# Patient Record
Sex: FEMALE | Race: WHITE
[De-identification: names, ages, dates, MRNs, and addresses within clinical notes are randomized per-mention and may not be internally consistent; named-entity substitution may affect disease eponyms.]

---

## 2018-08-07 ENCOUNTER — HOSPITAL ENCOUNTER (EMERGENCY)
Dept: HOSPITAL 91 - FTE | Age: 28
Discharge: HOME | End: 2018-08-07
Payer: COMMERCIAL

## 2018-08-07 ENCOUNTER — HOSPITAL ENCOUNTER (EMERGENCY)
Age: 28
Discharge: HOME | End: 2018-08-07

## 2018-08-07 DIAGNOSIS — J20.9: Primary | ICD-10-CM

## 2018-08-07 PROCEDURE — 99284 EMERGENCY DEPT VISIT MOD MDM: CPT

## 2018-08-28 ENCOUNTER — HOSPITAL ENCOUNTER (EMERGENCY)
Age: 28
Discharge: HOME | End: 2018-08-28

## 2018-08-28 ENCOUNTER — HOSPITAL ENCOUNTER (EMERGENCY)
Dept: HOSPITAL 91 - FTE | Age: 28
Discharge: HOME | End: 2018-08-28
Payer: COMMERCIAL

## 2018-08-28 DIAGNOSIS — J02.9: Primary | ICD-10-CM

## 2018-08-28 PROCEDURE — 87880 STREP A ASSAY W/OPTIC: CPT

## 2018-08-28 PROCEDURE — 99283 EMERGENCY DEPT VISIT LOW MDM: CPT

## 2018-08-28 RX ADMIN — ACETAMINOPHEN 1 MG: 500 TABLET, FILM COATED ORAL at 08:09

## 2019-01-25 ENCOUNTER — HOSPITAL ENCOUNTER (EMERGENCY)
Dept: HOSPITAL 91 - FTE | Age: 29
Discharge: LEFT BEFORE DISCHARGE | End: 2019-01-25
Payer: SELF-PAY

## 2019-01-25 ENCOUNTER — HOSPITAL ENCOUNTER (EMERGENCY)
Dept: HOSPITAL 10 - FTE | Age: 29
Discharge: LEFT BEFORE DISCHARGE | End: 2019-01-25
Payer: SELF-PAY

## 2019-01-25 VITALS
HEIGHT: 64 IN | HEIGHT: 64 IN | BODY MASS INDEX: 22.09 KG/M2 | WEIGHT: 129.41 LBS | BODY MASS INDEX: 22.09 KG/M2 | WEIGHT: 129.41 LBS

## 2019-01-25 VITALS — DIASTOLIC BLOOD PRESSURE: 61 MMHG | HEART RATE: 69 BPM | SYSTOLIC BLOOD PRESSURE: 119 MMHG | RESPIRATION RATE: 20 BRPM

## 2019-01-25 DIAGNOSIS — Z53.21: Primary | ICD-10-CM

## 2019-01-26 ENCOUNTER — HOSPITAL ENCOUNTER (EMERGENCY)
Dept: HOSPITAL 91 - FTE | Age: 29
Discharge: HOME | End: 2019-01-26
Payer: COMMERCIAL

## 2019-01-26 ENCOUNTER — HOSPITAL ENCOUNTER (EMERGENCY)
Dept: HOSPITAL 10 - FTE | Age: 29
Discharge: HOME | End: 2019-01-26
Payer: COMMERCIAL

## 2019-01-26 VITALS — HEART RATE: 68 BPM | RESPIRATION RATE: 18 BRPM | DIASTOLIC BLOOD PRESSURE: 53 MMHG | SYSTOLIC BLOOD PRESSURE: 113 MMHG

## 2019-01-26 VITALS — BODY MASS INDEX: 26.53 KG/M2 | HEIGHT: 55 IN | WEIGHT: 114.64 LBS

## 2019-01-26 DIAGNOSIS — H92.02: Primary | ICD-10-CM

## 2019-01-26 PROCEDURE — 99283 EMERGENCY DEPT VISIT LOW MDM: CPT

## 2019-01-26 NOTE — ERD
ER Documentation


Chief Complaint


Chief Complaint





LEFT EAR PAIN





HPI


28-year-old female presents for left ear pain times 4 days. Patient states it's 


a buzzing sensation.  She denies any recent sickness.  She states that she has 


similar symptoms while she is on a flight.  Denies any fevers or chills.





ROS


All systems reviewed and are negative except as per history of present illness.





Medications


Home Meds


Active Scripts


Prednisone (Prednisone) 20 Mg Tab, 20 MG PO DAILY for eustacian tube dysfunction


for 3 Days, #2 TAB


   Prov:DONNA HAYWOOD          1/26/19


Loratadine* (Claritin*) 10 Mg Tablet, 10 MG PO DAILY for serous otitis for 30 


Days, TAB


   Prov:DONNA HAYWOOD          1/26/19


Acetaminophen* (Tylophen*) 500 Mg Capsule, 1 CAP PO Q6H PRN for PAIN AND OR 


ELEVATED TEMP, #30 CAP


   Prov:BEAN HENRYC         8/28/18


Ibuprofen* (Motrin*) 600 Mg Tab, 600 MG PO Q6, #30 TAB


   Prov:BEAN HENRYC         8/28/18


Fluticasone Propionate (Flonase Allergy Relief) 9.9 Ml [place].susp, 2 SPRAY NASAL


DAILY, #1 BOTTLE


   TO EACH NOSTRIL


   Prov:BEAN HENRYC         8/28/18


Cetirizine Hcl* (Zyrtec*) 10 Mg Capsule, 10 MG PO DAILY, #14 TAB.CHEW


   Prov:BEAN HENRYC         8/28/18


Cetirizine Hcl* (Zyrtec*) 10 Mg Capsule, 10 MG PO DAILY, #30 TAB.CHEW


   Prov:VICTORIA VALENCIA NP         8/7/18


Benzonatate* (Tessalon Perle*) 100 Mg Capsule, 100 MG PO Q8H PRN for COUGH, #20 


CAP


   Prov:VICTORIA VALENCIA NP         8/7/18


Azithromycin* (Zithromax*) 250 Mg Tablet, 250 MG PO .ZPACK AS DIRECTED, #6 TAB


   TAKE 500 MG (2 TABS) THE FIRST DAY THEN 250 MG (1 TAB) DAYS 2-5


   Prov:VICTORIA VALENCIA NP         8/7/18


Ibuprofen* (Motrin*) 400 Mg Tab, 400 MG PO Q8, #15 TAB


   Prov:MARIANELA DUENAS MD         6/8/18


Baclofen* (Baclofen*) 10 Mg Tablet, 10 MG PO TID for 5 Days, #15 TAB


   Prov:MARIANELA DUENAS MD         6/8/18


Lorazepam* (Ativan*) 0.5 Mg Tablet, 0.5 MG PO Q8H PRN for ANXIETY, #10 TAB


   Prov:MARIANELA DUENAS MD         6/8/18


Naproxen* (Naprosyn*) 500 Mg Tablet, 500 MG PO BID PRN for PAIN AND/OR 


INFLAMMATION, #30 TAB


   Prov:DEREK JARA PA-C         7/12/17


Cyclobenzaprine Hcl* (Cyclobenzaprine Hcl*) 10 Mg Tablet, 10 MG PO TID, #15 TAB


   Prov:DEREK JARA PA-C         7/12/17


Azithromycin* (Zithromax*) 250 Mg Tablet, 250 MG PO .ZPACK AS DIRECTED, #6 TAB


   TAKE 500 MG (2 TABS) THE FIRST DAY THEN 250 MG (1 TAB) DAYS 2-5


   Prov:JESSI CORBIN MD         4/29/17


Dextromethorphan Hb-Promethazine Hcl (Promethazine DM Syrup) 473 Ml Syrup, 5 ML 


PO Q6H PRN for COUGH, #4 OZ


   Prov:JESSI CORBIN MD         4/29/17


Ibuprofen* (Motrin*) 600 Mg Tab, 600 MG PO Q6, #15 TAB


   Prov:JESSI CORBIN MD         4/29/17


Miconazole Nitrate* (Miconazole Nitrate*) 2% - 15 Gm Cr, 1 APPLIC TOP BID, #15 


TUB


   Prov:ANIBAL CORMIER PA-C         4/18/17


Clotrimazole* (Clotrimazole-7*) Vaginal Cream..g., 1 APPLIC VAG HS for 7 Days, 


EA


   Prov:ANIBAL CORMIER PA-C         4/18/17





Allergies


Allergies:  


Coded Allergies:  


     tramadol (Verified  Allergy, Unknown, sob, 8/7/18)





PMhx/Soc


History of Surgery:  Yes (Bilateral Breast Augmentation 2010)


Anesthesia Reaction:  No


Hx Neurological Disorder:  No


Hx Respiratory Disorders:  No


Hx Cardiac Disorders:  No


Hx Psychiatric Problems:  No


Hx Miscellaneous Medical Probl:  Yes (Miscarriage)


Hx Alcohol Use:  No


Hx Substance Use:  No


Hx Tobacco Use:  No


Smoking Status:  Never smoker





Physical Exam


Vitals


Vital Signs


  Date      Temp  Pulse  Resp  B/P (MAP)   Pulse Ox  O2          O2 Flow    FiO2


Time                                                 Delivery    Rate


   1/26/19  98.1     68    18      113/53        99


     12:35                           (73)





Physical Exam


Const:   No acute distress


Head:   Atraumatic 


Eyes:    Normal Conjunctiva


ENT:    Normal External Ears, bilateral tympanic membrane intact without 


erythema or bulging noted, nose and Mouth examination normal, no tonsillar 


swelling or exudate noted


Neck:               Full range of motion. No meningismus.


Resp:   Clear to auscultation bilaterally, no wheezing, rales, rhonchi


Cardio:   Regular rate and rhythm, no murmurs


Skin:   No petechiae or rashes


Ext:    No cyanosis, or edema


Neur:   Awake and alert


Psych:    Normal Mood and Affect





Procedures/MDM


Medical Decision Making:





Differential diagnosis includes but not limited to eustachian tube dysfunction, 


otitis media, otitis externa





Patient appeared well on physical exam.


Tympanic membranes intact bilaterally without erythema or bulging noted, there 


is low suspicion for otitis media or otitis externa





Patient may have eustachian tube dysfunction.





Patient given prescription for steroid and Claritin





Advised that she may need to follow with a ENT specialist if symptoms do not 


improve.





Patient advised to follow up with PCP in 1-2 days. Patient advised to return to 


ED for new or worsening symptoms. Patient stable on discharge from the ED.








Disclaimer: Inadvertent spelling and grammatical errors are likely due to 


EHR/dictation software use and do not reflect on the overall quality of patient 


care. Also, please note that the electronic time recorded on this note does not 


necessarily reflect the actual time of the patient encounter.





Departure


Diagnosis:  


   Primary Impression:  


   Left ear pain


Condition:  Fair


Patient Instructions:  Common Middle Ear Problems


Referrals:  


COMM[facility]ITY CLINICS


YOU HAVE RECEIVED A MEDICAL SCREENING EXAM AND THE RESULTS INDICATE THAT YOU DO 


NOT HAVE A CONDITION THAT REQUIRES URGENT TREATMENT IN THE EMERGENCY DEPARTMENT.





FURTHER EVALUATION AND TREATMENT OF YOUR CONDITION CAN WAIT UNTIL YOU ARE SEEN 


IN YOUR DOCTORS OFFICE WITHIN THE NEXT 1-2 DAYS. IT IS YOUR RESPONSIBILITY TO 


MAKE AN APPOINTMENT FOR FOLOW-UP CARE.





IF YOU HAVE A PRIMARY DOCTOR


--you should call your primary doctor and schedule an appointment





IF YOU DO NOT HAVE A PRIMARY DOCTOR YOU CAN CALL OUR PHYSICIAN REFERRAL HOTLINE 


AT


 [phone] 





IF YOU CAN NOT AFFORD TO SEE A PHYSICIAN YOU CAN CHOSE FROM THE FOLLOWING 


[facility] CLINICS





[facility] [phone] [address] [facility] [phone] [address] [facility]. [facility] [phone] [address] [facility] [phone] [address] [facility] [phone] [address] [facility]. [facility] [phone] [address]





Additional Instructions:  


Call your primary care doctor TOMORROW for an appointment during the next 1-2 


days.See the doctor sooner or return here if your condition worsens before your 


appointment time.











DONNA HAYWOOD DO                 Jan 26, 2019 21:52

## 2019-07-30 ENCOUNTER — HOSPITAL ENCOUNTER (EMERGENCY)
Dept: HOSPITAL 10 - E/R | Age: 29
Discharge: HOME | End: 2019-07-30
Payer: COMMERCIAL

## 2019-07-30 ENCOUNTER — HOSPITAL ENCOUNTER (EMERGENCY)
Dept: HOSPITAL 91 - E/R | Age: 29
Discharge: HOME | End: 2019-07-30
Payer: COMMERCIAL

## 2019-07-30 VITALS
BODY MASS INDEX: 21.22 KG/M2 | HEIGHT: 66 IN | WEIGHT: 132.06 LBS | WEIGHT: 132.06 LBS | BODY MASS INDEX: 21.22 KG/M2 | HEIGHT: 66 IN

## 2019-07-30 DIAGNOSIS — N64.4: Primary | ICD-10-CM

## 2019-07-30 PROCEDURE — 93005 ELECTROCARDIOGRAM TRACING: CPT

## 2019-07-30 PROCEDURE — 76642 ULTRASOUND BREAST LIMITED: CPT

## 2019-07-30 PROCEDURE — 99284 EMERGENCY DEPT VISIT MOD MDM: CPT

## 2019-07-30 NOTE — ERD
ER Documentation


Chief Complaint


Chief Complaint





left breast pain, redness around nipple area, has implants x1 day





HPI


Patient is a 28-year-old female,, no past medical history, who presents the ER 


for concerns of left-sided breast pain for the last 3 days and redness 


surrounding her nipple for the last day.  Patient has a history of breast 


implants placed 10 years ago in [place].  Patient states she feels as if 


her implant is moving.  Patient denies any fever chest pain, shortness of 


breath, left upper extremity pain, nausea, vomiting, diaphoresis or loss of c


onscious.  Patient states she noticed some redness surrounding her nipples that 


she presents to the ER today.





ROS


All systems reviewed and are negative except as per history of present illness.





Medications


Home Meds


Active Scripts


Ibuprofen* (Motrin*) 600 Mg Tab, 600 MG PO Q6, #30 TAB


   Prov:RODOLFO BARBOZA PA-C         19


Cephalexin* (Keflex*) 500 Mg Capsule, 500 MG PO TID for 7 Days, CAP


   Prov:RODOLFO BARBOZA PA-C         19


Prednisone (Prednisone) 20 Mg Tab, 20 MG PO DAILY for eustacian tube dysfunction


for 3 Days, #2 TAB


   Prov:DONNA HAYWOOD DO         19


Loratadine* (Claritin*) 10 Mg Tablet, 10 MG PO DAILY for serous otitis for 30 


Days, TAB


   Prov:DONNA HAYWOOD DO         19


Acetaminophen* (Tylophen*) 500 Mg Capsule, 1 CAP PO Q6H PRN for PAIN AND OR 


ELEVATED TEMP, #30 CAP


   Prov:BEAN HENRY PA-C         18


Ibuprofen* (Motrin*) 600 Mg Tab, 600 MG PO Q6, #30 TAB


   Prov:BEAN HENRY PA-C         18


Fluticasone Propionate (Flonase Allergy Relief) 9.9 Ml [place].susp, 2 SPRAY NASAL


DAILY, #1 BOTTLE


   TO EACH NOSTRIL


   Prov:BEAN HENRY PA-C         18


Cetirizine Hcl* (Zyrtec*) 10 Mg Capsule, 10 MG PO DAILY, #14 TAB.CHEW


   Prov:BEAN HENRY PA-C         18


Cetirizine Hcl* (Zyrtec*) 10 Mg Capsule, 10 MG PO DAILY, #30 TAB.CHEW


   Prov:VICTORIA VALENCIA NP         18


Benzonatate* (Tessalon Perle*) 100 Mg Capsule, 100 MG PO Q8H PRN for COUGH, #20 


CAP


   Prov:VICTORIA VALENCIA NP         18


Azithromycin* (Zithromax*) 250 Mg Tablet, 250 MG PO .ZPACK AS DIRECTED, #6 TAB


   TAKE 500 MG (2 TABS) THE FIRST DAY THEN 250 MG (1 TAB) DAYS 2-5


   Prov:VICTORIA VALENCIA NP         18


Ibuprofen* (Motrin*) 400 Mg Tab, 400 MG PO Q8, #15 TAB


   Prov:MARIANELA DUENAS MD         18


Baclofen* (Baclofen*) 10 Mg Tablet, 10 MG PO TID for 5 Days, #15 TAB


   Prov:MARIANELA DUENAS MD         18


Lorazepam* (Ativan*) 0.5 Mg Tablet, 0.5 MG PO Q8H PRN for ANXIETY, #10 TAB


   Prov:MARIANELA DUENAS MD         18


Naproxen* (Naprosyn*) 500 Mg Tablet, 500 MG PO BID PRN for PAIN AND/OR 


INFLAMMATION, #30 TAB


   Prov:DEREK JARA PA-C         17


Cyclobenzaprine Hcl* (Cyclobenzaprine Hcl*) 10 Mg Tablet, 10 MG PO TID, #15 TAB


   Prov:DEREK JARA PA-C         17


Azithromycin* (Zithromax*) 250 Mg Tablet, 250 MG PO .ZPACK AS DIRECTED, #6 TAB


   TAKE 500 MG (2 TABS) THE FIRST DAY THEN 250 MG (1 TAB) DAYS 2-5


   Prov:JESSI CORBIN MD         17


Dextromethorphan Hb-Promethazine Hcl (Promethazine DM Syrup) 473 Ml Syrup, 5 ML 


PO Q6H PRN for COUGH, #4 OZ


   Prov:JESSI CORBIN MD         17


Ibuprofen* (Motrin*) 600 Mg Tab, 600 MG PO Q6, #15 TAB


   Prov:JESSI CORBIN MD         17


Miconazole Nitrate* (Miconazole Nitrate*) 2% - 15 Gm Cr, 1 APPLIC TOP BID, #15 


TUB


   Prov:ANIBAL CORMIER PA-C         17


Clotrimazole* (Clotrimazole-7*) Vaginal Cream..g., 1 APPLIC VAG HS for 7 Days, 


EA


   Prov:ANIBAL CORMIER PA-C         17





Allergies


Allergies:  


Coded Allergies:  


     tramadol (Verified  Allergy, Unknown, sob, 18)





PMhx/Soc


History of Surgery:  Yes (Bilateral Breast Augmentation )


Anesthesia Reaction:  No


Hx Neurological Disorder:  No


Hx Respiratory Disorders:  No


Hx Cardiac Disorders:  No


Hx Psychiatric Problems:  No


Hx Miscellaneous Medical Probl:  Yes (Miscarriage)


Hx Alcohol Use:  No


Hx Substance Use:  No


Hx Tobacco Use:  No





FmHx


Family History:  No diabetes





Physical Exam


Vitals





Vital Signs


  Date      Temp  Pulse  Resp  B/P (MAP)   Pulse Ox  O2          O2 Flow    FiO2


Time                                                 Delivery    Rate


   19  99.2     80    18      112/56       100


     15:23                           (74)





Physical Exam


GENERAL: Well-developed, well-nourished female. Appears in no acute distress. 


Speaking in full sentences.


HEAD: Normocephalic, atraumatic. 


EYES: Pupils are equally reactive bilaterally. EOMs grossly intact. No 


conjunctival erythema. 


ENT: Moist mucous membranes. No uvula deviation. No kissing tonsils. 


L BREAST: Generalized tenderness noted with minimal palpation of the left 


breast.  Mild erythema and warmth noted surrounding the nipple in the 12 to 3 


o'clock position.  No nipple bleeding or discharge.


NECK: Supple. No meningismus. Normal range of motion of the neck.


LUNG: Clear to auscultation bilaterally. No rhonchi, wheezing, rales or coarse 


breath sounds. 


HEART: Regular rate and rhythm. No murmurs, rubs or gallops.  


EXTREMITIES: Equal pulses bilaterally. No peripheral clubbing, cyanosis or 


edema. No unilateral leg swelling.


NEUROLOGIC: Alert and oriented. Moving all four extremities without any 


difficulty. Normal speech. Steady gait. 


SKIN: Normal color. Warm and dry. No rashes or lesions.





Procedures/MDM


ED COURSE:


The patient was stable throughout ED course. I kept the patient and/or family 


informed of laboratory and diagnostic imaging results throughout the ED course. 








EKG:


Read by Dr. White, attending physician.


EKG shows normal sinus rhythm at a rate of 73 bpm.


No arrhythmias, acute ST elevations or T wave changes were noted.


 


DIAGNOSTIC IMAGING:


Read by radiologist.





Patient: SALOME PRESSLEY   : 1990   Age: 28  Sex: F     


                  


MR #:    [id number]   Acct #:   [id number]    DOS: 19 1556


Ordering MD: RODOLFO BARBOZA PA-C   Location:  E/R   Room/Bed:                   


                        





PROCEDURE:   Left breast ultrasound 


 


CLINICAL INDICATION:   Left breast pain, breast implant


 


TECHNIQUE:   Ultrasound of the left breast was performed 


 


COMPARISON:   None available


 


FINDINGS:


Left breast implant is seen and appears to be unremarkable on ultrasound. No 


cyst is seen. No solid mass is seen on ultrasound. 


 


IMPRESSION:


Left breast implant. Otherwise unremarkable left breast ultrasound.


 


Recommendation:  Clinical evaluation and management is recommended.


 


 


BIRADS category 1 - negative


 


RPTAT: HJES


_____________________________________________ 


.Edu Cardona MD, MD           Date    Time 


Electronically viewed and signed by .Edu Cardona MD, MD on 2019 17:50 


 


D:  2019 17:50  T:  2019 17:50


.S/





CC: RODOLFO BARBOZA PA-C





[phone]





PROCEDURES:


None.











MEDICAL DECISION MAKING:


Patient is a 28-year-old female, no past medical history, presents to the ER for


concerns of left-sided breast pain for last 3 days and redness surrounding her 


nipple x1 day.  Patient has a history of breast implants.  Patient is concerned 


that 1 of the implants is moved or popped.. Vital signs were reviewed. Patient 


is afebrile. Patient was not hypoxic. Patient was hemodynamically stable. 








On exam, patient does have tenderness to the left breast as well as some 


surrounding redness to the nipple.  Patient had no active bleeding or discharge.


 EKG was obtained and showed normal sinus rhythm.  No ST elevations were noted. 


Low suspicion for ACS.





Breast ultrasound was within normal limits.  No leakage was noted however I did 


explain to the patient that the definitive test would be a mammogram versus MRI.


 Patient was advised to follow-up with primary care physician for referral for 


mammogram versus referral.  Given that patient does have some breast redness, 


and patient will be treated with a course of advice for concerns of a localized 


cellulitis.  Low suspicion for deep space infection.  Patient was nontoxic, 


non-ill-appearing prior to discharge.  








PRESCRIPTION:


Keflex





DISCHARGE:


At this time, patient is stable for discharge and outpatient management. I have 


instructed the patient to follow-up with his/her primary care physician in 1-2 


days. I have discussed with the patient the possibility of needing to see a 


specialist for further workup and imaging studies if symptoms persist. I have 


instructed the patient to promptly return to the ER for any new or worsening 


symptoms including increased pain, fever, nausea, vomiting, weakness or LOC. The


patient and/or family expressed understanding of and agreement with this plan. 


All questions were answered. Home care instructions were provided. 





Disclaimer: Inadvertent spelling and grammatical errors are likely due to 


EHR/dictation software use and do not reflect on the overall quality of patient 


care. Also, please note that the electronic time recorded on this note does not 


necessarily reflect the actual time of the patient encounter.





Departure


Diagnosis:  


   Primary Impression:  


   Breast pain


Condition:  Fair


Patient Instructions:  Breast Self-Exam (BSE)


Referrals:  


[facility] (PCP)





Additional Instructions:  


Follow-up with breast specialist for further management of your symptoms.  You 


will need a mammogram versus MRI.





Call your primary care doctor TOMORROW for an appointment during the next 1-2 


days.See the doctor sooner or return here if your condition worsens before your 


appointment time.











RODOLFO BARBOZA PA-C             2019 18:24